# Patient Record
Sex: FEMALE | Race: WHITE | URBAN - METROPOLITAN AREA
[De-identification: names, ages, dates, MRNs, and addresses within clinical notes are randomized per-mention and may not be internally consistent; named-entity substitution may affect disease eponyms.]

---

## 2019-12-12 ENCOUNTER — APPOINTMENT (RX ONLY)
Dept: URBAN - METROPOLITAN AREA CLINIC 11 | Facility: CLINIC | Age: 49
Setting detail: DERMATOLOGY
End: 2019-12-12

## 2019-12-12 DIAGNOSIS — Z41.1 ENCOUNTER FOR COSMETIC SURGERY: ICD-10-CM

## 2019-12-12 DIAGNOSIS — Z41.9 ENCOUNTER FOR PROCEDURE FOR PURPOSES OTHER THAN REMEDYING HEALTH STATE, UNSPECIFIED: ICD-10-CM

## 2019-12-12 PROCEDURE — Z1093: HCPCS

## 2019-12-12 PROCEDURE — ? CONSULTATION - AESTHETIC FACIAL DEFORMITY

## 2019-12-12 PROCEDURE — Z1024: HCPCS

## 2019-12-12 PROCEDURE — ? BOTOX

## 2019-12-12 PROCEDURE — 99003: CPT

## 2019-12-12 PROCEDURE — Z1094: HCPCS

## 2019-12-12 PROCEDURE — ? FILLERS

## 2019-12-12 ASSESSMENT — LOCATION DETAILED DESCRIPTION DERM
LOCATION DETAILED: RIGHT INFERIOR CENTRAL MALAR CHEEK
LOCATION DETAILED: RIGHT MEDIAL MANDIBULAR CHEEK
LOCATION DETAILED: LEFT MEDIAL MANDIBULAR CHEEK
LOCATION DETAILED: GLABELLA
LOCATION DETAILED: RIGHT SUPERIOR MEDIAL BUCCAL CHEEK
LOCATION DETAILED: LEFT SUPERIOR MEDIAL BUCCAL CHEEK
LOCATION DETAILED: LEFT LATERAL EYEBROW
LOCATION DETAILED: RIGHT LATERAL EYEBROW
LOCATION DETAILED: LEFT INFERIOR CENTRAL MALAR CHEEK

## 2019-12-12 ASSESSMENT — LOCATION ZONE DERM: LOCATION ZONE: FACE

## 2019-12-12 ASSESSMENT — LOCATION SIMPLE DESCRIPTION DERM
LOCATION SIMPLE: LEFT EYEBROW
LOCATION SIMPLE: LEFT CHEEK
LOCATION SIMPLE: RIGHT CHEEK
LOCATION SIMPLE: RIGHT EYEBROW
LOCATION SIMPLE: GLABELLA

## 2019-12-12 NOTE — PROCEDURE: FILLERS
Cheeks Filler Volume In Cc: 0
Lot #: [id number]
Additional Area 2 Location: l cheek
Price $ (Numeric Only, No Text Please.): 982
Use Map Statement For Sites (Optional): No
Consent: Verbal and written consent obtained. Risks include but not limited to bruising, beading, irregular texture, ulceration, infection, allergic reaction, scar formation, incomplete augmentation, temporary nature, procedural pain.
Topical Anesthetic Base: cream
Lot #: [id number]
Administered By (Optional): Dr. Hans Escalante
Additional Area 2 Volume In Cc: 0.5
Topical Anesthetic #2: prilocaine
Jawline Filler Volume In Cc: 1
Show Price In Note?: yes
Expiration Date (Month Year): 04/2022
Map Statment: See attached map for complete details
Price $ (Numeric Only, No Text Please.): 234
Price $ (Numeric Only, No Text Please.): 806
Topical Anesthetic #3: phenenylephrine
Filler: Dmitry Nelson (Perlane-L)
Detail Level: Simple
Topical Anesthetic #1: lidocaine
Filler: Restylane Defyne
Additional Area 1 Location: r cheek
Expiration Date (Month Year): 03/2021
Lot #: [id number]

## 2019-12-12 NOTE — PROCEDURE: BOTOX
Glabellar Complex Units: 25
Bill Summary Price Listed Below, Or Bill Total Of Units X Price Per Unit?: Bill Summary Price Below
Postcare Instructions: Patient instructed to not lie down for 4 hours and limit physical activity for 24 hours. Patient instructed not to travel by airplane for 48 hours.\\n\\n
Additional Area 1 Location: lateral brows
Additional Area 2 Units: 0
Dilution (U/0.1 Cc): 5
Use Map Statement For Sites (Optional): No
Price Per Unit In $ (Use Numbers Only, No Text Please.): 14
Consent: Verbal and written consent was obtained prior to the procedure. Risks, benefits, expectations and alternatives were discussed including, but not limited to, infection, bleeding, lid/brow ptosis, bruising, swelling, diplopia, temporary effects, incomplete chemical denervation and dissatisfaction with the cosmetic outcome. No guarantee or warranty was given or implied regarding longevity of results.
Show Price In Note?: yes
Summary Price $ (Use Numbers Only, No Text Please.): 392
Additional Area 1 Units: 3
Detail Level: Simple
Reconstitution Date: 12/12/2019
Additional Area 3 Location: masseters
Lot #: [id number]
Administered By (Optional): Dr. Hans Escalante
Additional Area 2 Location: left brow
Map Statment: See attached map for complete details

## 2020-01-27 ENCOUNTER — APPOINTMENT (RX ONLY)
Dept: URBAN - METROPOLITAN AREA CLINIC 11 | Facility: CLINIC | Age: 50
Setting detail: DERMATOLOGY
End: 2020-01-27

## 2020-01-27 DIAGNOSIS — Z41.1 ENCOUNTER FOR COSMETIC SURGERY: ICD-10-CM

## 2020-01-27 PROCEDURE — ? SKIN CARE REGIMEN

## 2020-01-27 PROCEDURE — 99005: CPT

## 2020-01-27 PROCEDURE — ? CONSULTATION - AESTHETIC FACIAL DEFORMITY

## 2020-01-27 ASSESSMENT — LOCATION SIMPLE DESCRIPTION DERM
LOCATION SIMPLE: LEFT CHEEK
LOCATION SIMPLE: RIGHT CHEEK

## 2020-01-27 ASSESSMENT — LOCATION ZONE DERM: LOCATION ZONE: FACE

## 2020-01-27 ASSESSMENT — LOCATION DETAILED DESCRIPTION DERM
LOCATION DETAILED: LEFT MEDIAL MANDIBULAR CHEEK
LOCATION DETAILED: LEFT CENTRAL MALAR CHEEK
LOCATION DETAILED: RIGHT CENTRAL MALAR CHEEK
LOCATION DETAILED: RIGHT MEDIAL MANDIBULAR CHEEK

## 2020-05-28 ENCOUNTER — APPOINTMENT (RX ONLY)
Dept: URBAN - METROPOLITAN AREA CLINIC 11 | Facility: CLINIC | Age: 50
Setting detail: DERMATOLOGY
End: 2020-05-28

## 2020-05-28 DIAGNOSIS — Z41.9 ENCOUNTER FOR PROCEDURE FOR PURPOSES OTHER THAN REMEDYING HEALTH STATE, UNSPECIFIED: ICD-10-CM

## 2020-05-28 PROCEDURE — ? FILLERS

## 2020-05-28 PROCEDURE — Z1024: HCPCS

## 2020-05-28 PROCEDURE — ? BOTOX

## 2020-05-28 PROCEDURE — Z1093: HCPCS

## 2020-05-28 PROCEDURE — Z1094: HCPCS

## 2020-05-28 ASSESSMENT — LOCATION DETAILED DESCRIPTION DERM
LOCATION DETAILED: LEFT CENTRAL MALAR CHEEK
LOCATION DETAILED: RIGHT INFERIOR LATERAL BUCCAL CHEEK
LOCATION DETAILED: LEFT INFERIOR LATERAL BUCCAL CHEEK
LOCATION DETAILED: LEFT LATERAL MALAR CHEEK
LOCATION DETAILED: RIGHT CENTRAL MALAR CHEEK
LOCATION DETAILED: RIGHT LATERAL MALAR CHEEK
LOCATION DETAILED: LEFT INFERIOR LATERAL FOREHEAD
LOCATION DETAILED: GLABELLA

## 2020-05-28 ASSESSMENT — LOCATION SIMPLE DESCRIPTION DERM
LOCATION SIMPLE: LEFT CHEEK
LOCATION SIMPLE: RIGHT CHEEK
LOCATION SIMPLE: GLABELLA
LOCATION SIMPLE: LEFT FOREHEAD

## 2020-05-28 ASSESSMENT — LOCATION ZONE DERM: LOCATION ZONE: FACE

## 2020-05-28 NOTE — PROCEDURE: FILLERS
Dorsal Hands Filler Volume In Cc: 0
Consent: Verbal and written consent obtained. Risks include but not limited to bruising, beading, irregular texture, ulceration, infection, allergic reaction, scar formation, incomplete augmentation, temporary nature, procedural pain.
Filler: Dmitry Nelson (Perlane-L)
Expiration Date (Month Year): 08/2022
Marionette Lines Filler Volume In Cc: 1
Administered By (Optional): Dr. Hans Esclaante
Lot #: [id number]
Additional Area 1 Location: lips
Price $ (Numeric Only, No Text Please.): 1300
Topical Anesthetic #2: prilocaine
Show Price In Note?: yes
Lot #: [id number]
Cheeks Filler Volume In Cc: 2
Lot #: [id number]
Use Map Statement For Sites (Optional): No
Price $ (Numeric Only, No Text Please.): 882
Topical Anesthetic #3: phenenylephrine
Topical Anesthetic #1: lidocaine
Expiration Date (Month Year): 08/2021
Map Statment: See attached map for complete details
Topical Anesthetic Base: cream
Filler: Restylane Defyne
Price $ (Numeric Only, No Text Please.): 750
Detail Level: Simple

## 2020-05-28 NOTE — PROCEDURE: BOTOX
Price Per Unit In $ (Use Numbers Only, No Text Please.): 13.75
Additional Area 3 Units: 0
Bill Summary Price Listed Below, Or Bill Total Of Units X Price Per Unit?: Bill Summary Price Below
Detail Level: Simple
Administered By (Optional): Dr. Hans Escalante
Consent: Verbal and written consent was obtained prior to the procedure. Risks, benefits, expectations and alternatives were discussed including, but not limited to, infection, bleeding, lid/brow ptosis, bruising, swelling, diplopia, temporary effects, incomplete chemical denervation and dissatisfaction with the cosmetic outcome. No guarantee or warranty was given or implied regarding longevity of results.
Additional Area 1 Location: r lat brow
Dilution (U/0.1 Cc): 5
Lot #: [id number]
Glabellar Complex Units: 25
Additional Area 2 Location: l lat brow
Expiration Date (Month Year): 09/2022
Additional Area 2 Units: 2.5
Reconstitution Date: 05/28/2020
Map Statment: See attached map for complete details
Show Price In Note?: yes
Summary Price $ (Use Numbers Only, No Text Please.): 507
Postcare Instructions: Patient instructed to not lie down for 4 hours and limit physical activity for 24 hours. Patient instructed not to travel by airplane for 48 hours.\\n\\n
Use Map Statement For Sites (Optional): No

## 2020-12-16 ENCOUNTER — APPOINTMENT (RX ONLY)
Dept: URBAN - METROPOLITAN AREA CLINIC 11 | Facility: CLINIC | Age: 50
Setting detail: DERMATOLOGY
End: 2020-12-16

## 2020-12-16 DIAGNOSIS — Z41.1 ENCOUNTER FOR COSMETIC SURGERY: ICD-10-CM

## 2020-12-16 DIAGNOSIS — Z41.9 ENCOUNTER FOR PROCEDURE FOR PURPOSES OTHER THAN REMEDYING HEALTH STATE, UNSPECIFIED: ICD-10-CM

## 2020-12-16 PROCEDURE — ? BOTOX

## 2020-12-16 PROCEDURE — Z1024: HCPCS

## 2020-12-16 PROCEDURE — Z1093: HCPCS

## 2020-12-16 PROCEDURE — ? FILLERS

## 2020-12-16 PROCEDURE — ? CONSULTATION - AESTHETIC FACIAL DEFORMITY

## 2020-12-16 PROCEDURE — Z1094: HCPCS

## 2020-12-16 ASSESSMENT — LOCATION DETAILED DESCRIPTION DERM
LOCATION DETAILED: INFERIOR MID FOREHEAD
LOCATION DETAILED: GLABELLA
LOCATION DETAILED: LEFT INFERIOR CENTRAL MALAR CHEEK
LOCATION DETAILED: LEFT LOWER CUTANEOUS LIP
LOCATION DETAILED: RIGHT MID TEMPLE
LOCATION DETAILED: LEFT MID TEMPLE

## 2020-12-16 ASSESSMENT — LOCATION SIMPLE DESCRIPTION DERM
LOCATION SIMPLE: INFERIOR FOREHEAD
LOCATION SIMPLE: RIGHT TEMPLE
LOCATION SIMPLE: GLABELLA
LOCATION SIMPLE: LEFT CHEEK
LOCATION SIMPLE: LEFT TEMPLE
LOCATION SIMPLE: LEFT LIP

## 2020-12-16 ASSESSMENT — LOCATION ZONE DERM
LOCATION ZONE: FACE
LOCATION ZONE: LIP

## 2020-12-16 NOTE — PROCEDURE: FILLERS
Tear Troughs Filler Volume In Cc: 0
Additional Area 1 Location: lips
Administered By (Optional): Dr. Hans Escalante
Cheeks Filler Volume In Cc: 2
Injected Anesthesia In Cc: 0.6
Lot #: [id number]
Filler: Restylane Refyne
Show Price In Note?: yes
Jawline Filler Volume In Cc: 1
Use Map Statement For Sites (Optional): No
Topical Anesthetic #3: phenenylephrine
Map Statment: See attached map for complete details
Expiration Date (Month Year): 7/31/2023
Lot #: [id number]
Topical Anesthetic Base: cream
Expiration Date (Month Year): 11/30/2021
Price $ (Numeric Only, No Text Please.): 2733
Topical Anesthetic #1: lidocaine
Price $ (Numeric Only, No Text Please.): 1300
Consent: Verbal and written consent obtained. Risks include but not limited to bruising, beading, irregular texture, ulceration, infection, allergic reaction, scar formation, incomplete augmentation, temporary nature, procedural pain.
Filler: Dmitry Nelson (Perlane-L)
Detail Level: Simple
Topical Anesthetic #2: prilocaine

## 2020-12-16 NOTE — PROCEDURE: BOTOX
Additional Area 1 Location: r lat brow
Consent: Verbal and written consent was obtained prior to the procedure. Risks, benefits, expectations and alternatives were discussed including, but not limited to, infection, bleeding, lid/brow ptosis, bruising, swelling, diplopia, temporary effects, incomplete chemical denervation and dissatisfaction with the cosmetic outcome. No guarantee or warranty was given or implied regarding longevity of results.
Dilution (U/0.1 Cc): 5
Judah Units: 0
Price Per Unit In $ (Use Numbers Only, No Text Please.): 14
Additional Area 3 Units: 50
Bill Summary Price Listed Below, Or Bill Total Of Units X Price Per Unit?: Bill Summary Price Below
Show Price In Note?: yes
Administered By (Optional): Dr. Hans Escalante
Postcare Instructions: Patient instructed to not lie down for 4 hours and limit physical activity for 24 hours. Patient instructed not to travel by airplane for 48 hours.\\n\\n
Reconstitution Date: 12/16/2020
Map Statment: See attached map for complete details
Periorbital Skin Units: 20
Summary Price $ (Use Numbers Only, No Text Please.): 1473
Detail Level: Simple
Use Map Statement For Sites (Optional): No
Lot #: [id number]
Additional Area 3 Location: masseter muscle
Additional Area 2 Location: l lat brow
Forehead Units: 10
Expiration Date (Month Year): 09/2023
Glabellar Complex Units: 25

## 2021-01-13 ENCOUNTER — APPOINTMENT (RX ONLY)
Dept: URBAN - METROPOLITAN AREA CLINIC 11 | Facility: CLINIC | Age: 51
Setting detail: DERMATOLOGY
End: 2021-01-13

## 2021-01-13 DIAGNOSIS — Z41.9 ENCOUNTER FOR PROCEDURE FOR PURPOSES OTHER THAN REMEDYING HEALTH STATE, UNSPECIFIED: ICD-10-CM

## 2021-01-13 PROCEDURE — Z1024: HCPCS

## 2021-01-13 PROCEDURE — ? BOTOX

## 2021-01-13 ASSESSMENT — LOCATION ZONE DERM: LOCATION ZONE: FACE

## 2021-01-13 ASSESSMENT — LOCATION SIMPLE DESCRIPTION DERM
LOCATION SIMPLE: RIGHT EYEBROW
LOCATION SIMPLE: LEFT CHEEK
LOCATION SIMPLE: LEFT EYEBROW
LOCATION SIMPLE: RIGHT CHEEK

## 2021-01-13 ASSESSMENT — LOCATION DETAILED DESCRIPTION DERM
LOCATION DETAILED: LEFT LATERAL EYEBROW
LOCATION DETAILED: RIGHT LATERAL EYEBROW
LOCATION DETAILED: RIGHT LATERAL MANDIBULAR CHEEK
LOCATION DETAILED: LEFT LATERAL MANDIBULAR CHEEK

## 2021-01-13 NOTE — PROCEDURE: BOTOX
Platsyma Units: 0
Lot #:  [id number]
Postcare Instructions: Patient instructed to not lie down for 4 hours and limit physical activity for 24 hours. Patient instructed not to travel by airplane for 48 hours.\\n\\n
Additional Area 2 Units: 1.5
Map Statment: See attached map for complete details
Use Map Statement For Sites (Optional): No
Show Price In Note?: yes
Additional Area 3 Location: Masseters
Reconstitution Date: 01/7/2021
Summary Price $ (Use Numbers Only, No Text Please.): 078
Expiration Date (Month Year): 05/2023
Dilution (U/0.1 Cc): 5
Additional Area 2 Location: l lat brow
Additional Area 1 Location: r lat brow
Bill Summary Price Listed Below, Or Bill Total Of Units X Price Per Unit?: Bill Summary Price Below
Detail Level: Simple
Price Per Unit In $ (Use Numbers Only, No Text Please.): 14
Additional Area 1 Units: 2.5
Administered By (Optional): Dr. Hans Escalante
Consent: Verbal and written consent was obtained prior to the procedure. Risks, benefits, expectations and alternatives were discussed including, but not limited to, infection, bleeding, lid/brow ptosis, bruising, swelling, diplopia, temporary effects, incomplete chemical denervation and dissatisfaction with the cosmetic outcome. No guarantee or warranty was given or implied regarding longevity of results.
Additional Area 3 Units: 50